# Patient Record
Sex: FEMALE | URBAN - METROPOLITAN AREA
[De-identification: names, ages, dates, MRNs, and addresses within clinical notes are randomized per-mention and may not be internally consistent; named-entity substitution may affect disease eponyms.]

---
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
## 2020-10-01 ENCOUNTER — OFFICE VISIT (OUTPATIENT)
Dept: URBAN - METROPOLITAN AREA CLINIC 75 | Facility: CLINIC | Age: 20
End: 2020-10-01
Payer: COMMERCIAL

## 2020-10-01 DIAGNOSIS — H52.13 MYOPIA, BILATERAL: Primary | ICD-10-CM

## 2020-10-01 PROCEDURE — 92310 CONTACT LENS FITTING OU: CPT | Performed by: OPTOMETRIST

## 2020-10-01 PROCEDURE — 92014 COMPRE OPH EXAM EST PT 1/>: CPT | Performed by: OPTOMETRIST

## 2020-10-01 ASSESSMENT — VISUAL ACUITY
OS: 20/20
OD: 20/20

## 2020-10-01 ASSESSMENT — KERATOMETRY
OD: 47.50
OS: 46.88

## 2022-11-29 ENCOUNTER — OFFICE VISIT (OUTPATIENT)
Dept: URBAN - METROPOLITAN AREA CLINIC 71 | Facility: CLINIC | Age: 22
End: 2022-11-29
Payer: COMMERCIAL

## 2022-11-29 DIAGNOSIS — H15.102 UNSPECIFIED EPISCLERITIS, LEFT EYE: Primary | ICD-10-CM

## 2022-11-29 PROCEDURE — 99212 OFFICE O/P EST SF 10 MIN: CPT

## 2022-11-29 RX ORDER — PREDNISOLONE ACETATE 10 MG/ML
1 % SUSPENSION/ DROPS OPHTHALMIC
Qty: 1 | Refills: 0 | Status: ACTIVE
Start: 2022-11-29

## 2022-11-29 ASSESSMENT — INTRAOCULAR PRESSURE
OD: 11
OS: 12

## 2022-11-29 NOTE — IMPRESSION/PLAN
Impression: Unspecified episcleritis, left eye: H15.102. Plan: Discussed diagnosis with the patient. Prescribed pred 1% TID OS only for 10 days then stop. Instructed patient to RTC PRN or sooner if any issues develop. Patient expressed understanding.

## 2023-06-14 ENCOUNTER — OFFICE VISIT (OUTPATIENT)
Dept: URBAN - METROPOLITAN AREA CLINIC 75 | Facility: CLINIC | Age: 23
End: 2023-06-14
Payer: COMMERCIAL

## 2023-06-14 DIAGNOSIS — H15.102 EPISCLERITIS OF LEFT EYE: Primary | ICD-10-CM

## 2023-06-14 PROCEDURE — 99214 OFFICE O/P EST MOD 30 MIN: CPT | Performed by: OPTOMETRIST

## 2023-06-14 RX ORDER — PREDNISOLONE ACETATE 10 MG/ML
1 % SUSPENSION/ DROPS OPHTHALMIC
Qty: 5 | Refills: 0 | Status: ACTIVE
Start: 2023-06-14

## 2023-06-14 ASSESSMENT — INTRAOCULAR PRESSURE
OS: 8
OD: 8

## 2023-06-14 NOTE — IMPRESSION/PLAN
Impression: Episcleritis of left eye: H15.102. Neg infection Plan: Discussed in detail. Pt to begin Pred BID OS only for 10 days then D/C. PRN following with flare ups.

## 2023-07-14 ENCOUNTER — OFFICE VISIT (OUTPATIENT)
Dept: URBAN - METROPOLITAN AREA CLINIC 75 | Facility: CLINIC | Age: 23
End: 2023-07-14
Payer: COMMERCIAL

## 2023-07-14 DIAGNOSIS — H52.13 MYOPIA, BILATERAL: Primary | ICD-10-CM

## 2023-07-14 PROCEDURE — 92014 COMPRE OPH EXAM EST PT 1/>: CPT | Performed by: OPTOMETRIST

## 2023-07-14 ASSESSMENT — VISUAL ACUITY
OS: 20/20
OD: 20/20

## 2023-07-14 ASSESSMENT — KERATOMETRY
OS: 47.38
OD: 48.63

## 2023-07-14 ASSESSMENT — INTRAOCULAR PRESSURE
OD: 11
OS: 9

## 2023-07-14 NOTE — IMPRESSION/PLAN
Impression: Myopia, bilateral: H52.13. Plan: New glasses and soft CL Rx was given today. Small changes made in both eyes increased -0.25. Call if [address] worsens.